# Patient Record
Sex: MALE | Race: WHITE | NOT HISPANIC OR LATINO | Employment: STUDENT | RURAL
[De-identification: names, ages, dates, MRNs, and addresses within clinical notes are randomized per-mention and may not be internally consistent; named-entity substitution may affect disease eponyms.]

---

## 2022-01-17 ENCOUNTER — OFFICE VISIT (OUTPATIENT)
Dept: FAMILY MEDICINE | Facility: CLINIC | Age: 27
End: 2022-01-17

## 2022-01-17 VITALS
BODY MASS INDEX: 29.84 KG/M2 | OXYGEN SATURATION: 97 % | DIASTOLIC BLOOD PRESSURE: 80 MMHG | HEART RATE: 92 BPM | RESPIRATION RATE: 18 BRPM | HEIGHT: 75 IN | WEIGHT: 240 LBS | SYSTOLIC BLOOD PRESSURE: 130 MMHG | TEMPERATURE: 99 F

## 2022-01-17 DIAGNOSIS — J02.9 SORE THROAT: ICD-10-CM

## 2022-01-17 DIAGNOSIS — U07.1 COVID: Primary | ICD-10-CM

## 2022-01-17 DIAGNOSIS — Z20.822 EXPOSURE TO COVID-19 VIRUS: ICD-10-CM

## 2022-01-17 DIAGNOSIS — R53.83 FATIGUE, UNSPECIFIED TYPE: ICD-10-CM

## 2022-01-17 LAB
CTP QC/QA: YES
FLUAV AG NPH QL: NEGATIVE
FLUBV AG NPH QL: NEGATIVE
S PYO RRNA THROAT QL PROBE: NEGATIVE
SARS-COV-2 AG RESP QL IA.RAPID: POSITIVE

## 2022-01-17 PROCEDURE — 87804 INFLUENZA ASSAY W/OPTIC: CPT | Mod: QW,,, | Performed by: NURSE PRACTITIONER

## 2022-01-17 PROCEDURE — 87426 SARS CORONAVIRUS 2 ANTIGEN POCT: ICD-10-PCS | Mod: QW,,, | Performed by: NURSE PRACTITIONER

## 2022-01-17 PROCEDURE — 87880 POCT RAPID STREP A: ICD-10-PCS | Mod: QW,,, | Performed by: NURSE PRACTITIONER

## 2022-01-17 PROCEDURE — 87804 POCT INFLUENZA A/B: ICD-10-PCS | Mod: QW,,, | Performed by: NURSE PRACTITIONER

## 2022-01-17 PROCEDURE — 87426 SARSCOV CORONAVIRUS AG IA: CPT | Mod: QW,,, | Performed by: NURSE PRACTITIONER

## 2022-01-17 PROCEDURE — 87880 STREP A ASSAY W/OPTIC: CPT | Mod: QW,,, | Performed by: NURSE PRACTITIONER

## 2022-01-17 PROCEDURE — 99202 OFFICE O/P NEW SF 15 MIN: CPT | Mod: ,,, | Performed by: NURSE PRACTITIONER

## 2022-01-17 PROCEDURE — 99202 PR OFFICE/OUTPT VISIT, NEW, LEVL II, 15-29 MIN: ICD-10-PCS | Mod: ,,, | Performed by: NURSE PRACTITIONER

## 2022-01-17 RX ORDER — GUAIFENESIN AND DEXTROMETHORPHAN HYDROBROMIDE 1200; 60 MG/1; MG/1
1 TABLET, EXTENDED RELEASE ORAL 2 TIMES DAILY PRN
Qty: 20 TABLET | Refills: 0 | Status: SHIPPED | OUTPATIENT
Start: 2022-01-17 | End: 2022-01-27

## 2022-01-17 NOTE — PROGRESS NOTES
"New clinic note    Serafin Draper is a 26 y.o. male     Chief Complaint:   Chief Complaint   Patient presents with    URI     Headache, sore throat, congestion,bodyaches, and fatigue        Subjective:    Patient complains of sore throat, cough, headache, and fatigue. Symptoms X 3 days. Patient states he has been taking sudafed which helps temporarily. Denies fever. Admits to being fully vaccinated for covid.     URI   Associated symptoms include congestion, coughing, headaches and a sore throat.          Current Outpatient Medications:     dextromethorphan-guaiFENesin (MUCINEX DM) 60-1,200 mg per 12 hr tablet, Take 1 tablet by mouth 2 (two) times daily as needed (cough/congestion)., Disp: 20 tablet, Rfl: 0   History reviewed. No pertinent past medical history.       Review of Systems   Constitutional: Positive for fatigue. Negative for fever.   HENT: Positive for nasal congestion, sinus pressure/congestion and sore throat.    Respiratory: Positive for cough. Negative for shortness of breath.    Musculoskeletal: Positive for myalgias.   Neurological: Positive for headaches.        Objective:    /80 (BP Location: Left arm, Patient Position: Sitting, BP Method: Large (Manual))   Pulse 92   Temp 98.8 °F (37.1 °C) (Oral)   Resp 18   Ht 6' 3" (1.905 m)   Wt 108.9 kg (240 lb)   SpO2 97%   BMI 30.00 kg/m²      Physical Exam  Constitutional:       Appearance: Normal appearance.   Cardiovascular:      Rate and Rhythm: Normal rate and regular rhythm.      Pulses: Normal pulses.      Heart sounds: Normal heart sounds.   Pulmonary:      Effort: Pulmonary effort is normal.      Breath sounds: Normal breath sounds.   Neurological:      Mental Status: He is alert and oriented to person, place, and time.          Assessment and Plan:  1. COVID    2. Exposure to COVID-19 virus    3. Sore throat    4. Fatigue, unspecified type         Problem List Items Addressed This Visit    None     Visit Diagnoses     COVID    -  " Primary    Relevant Medications    dextromethorphan-guaiFENesin (MUCINEX DM) 60-1,200 mg per 12 hr tablet    Exposure to COVID-19 virus        Relevant Orders    SARS Coronavirus 2 Antigen, POCT (Completed)    Sore throat        Relevant Orders    POCT rapid strep A (Completed)    Fatigue, unspecified type        Relevant Orders    POCT Influenza A/B (Completed)       covid +  Flu -  Strep -    covid--discussed viral illness and quarantine guidelines. rx mucinex dm bid prn. Drink plenty of fluids. Recommend vitamins c,d, and zinc otc.  Alternate tylenol and ibuprofen prn ache/fever.     There are no Patient Instructions on file for this visit.   Follow up if symptoms worsen or fail to improve.

## 2022-01-17 NOTE — LETTER
January 17, 2022      [facility]  [address] [place] [address]  Phone: [phone]  Fax: [phone]       Patient: Serafin Draper   YOB: 1995  Date of Visit: 01/17/2022    To Whom It May Concern:    Tegan Draper  was at  on 01/17/2022.Self quarantine for five days. The patient may return to school 01/24/2022  as long as symptoms have started to improve and you have been fever free for 24 hours. Wear a good fitting mask for an additional five days after quarantine. If you have any questions or concerns, or if I can be of further assistance, please do not hesitate to contact me.     Sincerely,    Magui MENDIETA